# Patient Record
(demographics unavailable — no encounter records)

---

## 2024-11-19 NOTE — PHYSICAL EXAM

## 2024-11-19 NOTE — HISTORY OF PRESENT ILLNESS
[FreeTextEntry1] : 79-year-old male history of diverticulosis 6 cm hiatal hernia and diminutive polyp.  Additionally has bleeding per rectum several times a week related to his known grade 1 internal hemorrhoids.  He underwent endoscopy and colonoscopy by Dr. Jerald Garcia MD.  He has occasional dysphagia to chicken.  There is no weight loss, anorexia or regurgitation.  He denies any shortness of breath or chest pain.  He was seen in the accompaniment of his wife and a Irish-speaking  in the office  RTO 11/19/24- feels well, Son Martin Mosqueda MD GI is on phone for visit. REviewed recent VCE which was NORMAL. We discussed that possible 6 cm hiatal hernia with ? familia lesion can be source of anemia. Has followup with hematology next month after having completed 3 Injectafer infusions . Feels well.Maintains pantoprazole. [de-identified] : 10/2024 normal

## 2024-11-19 NOTE — ASSESSMENT
[FreeTextEntry1] : 79-year-old male history of diverticulosis 6 cm hiatal hernia and diminutive polyp.  Additionally has bleeding per rectum several times a week related to his known grade 1 internal hemorrhoids.  He underwent endoscopy and colonoscopy by Dr. Jerald Garcia MD.  He has occasional dysphagia to chicken.  There is no weight loss, anorexia or regurgitation.  He denies any shortness of breath or chest pain.  He was seen in the accompaniment of his wife and a Guamanian-speaking  in the office  RTO 11/19/24- feels well, Son Martin Mosqueda MD GI is on phone for visit. REviewed recent VCE which was NORMAL. We discussed that possible 6 cm hiatal hernia with ? familia lesion can be source of anemia. Has followup with hematology next month after having completed 3 Injectafer infusions . Feels well.Maintains pantoprazole.  IMP 1. anemia, NORMAL VE 2. Large hiatal hernia, 6 cm 3. INternal hem 4. Hx of TA 5. diverticulosis  PLAN: 1. followup with Dr. Garcia 2. Hematology followup as pt already has appt

## 2024-11-19 NOTE — REVIEW OF SYSTEMS
[As Noted in HPI] : as noted in HPI [Swollen Glands] : swollen glands [Negative] : Heme/Lymph [FreeTextEntry7] : diverticulosis dysphagia

## 2024-12-06 NOTE — ASSESSMENT
[FreeTextEntry1] : Dyspepsia: The patient complains of dyspeptic symptoms.  The patient was advised to continue to abide by an anti-gas (low FOD-MAP) diet.  The patient was previously given a pamphlet for anti-gas (low FOD-MAP).  The patient and I reviewed the anti-gas (low FOD-MAP)diet at length again. The patient is to continue on a trial of Simethicone one tablet 4 times a day p.r.n. abdominal pain and gas. Rectal Bleeding: The patient had episodes of rectal bleeding.  The etiology of the rectal bleeding is unclear but most likely secondary to hemorrhoids.  I recommend a trial of Anusol HC suppositories one per rectum QHS and Anusol HC 2.5% cream apply to affected area twice a day PRN hemorrhoidal bleeding or pain.  I recommend a trial of Calmoseptine cream apply to affected area twice a day for rectal discomfort. I recommend Tucks pads for the hemorrhoids.  I recommend starting Sitz baths twice a day for the hemorrhoids.  I recommend avoid wearing tight underwear and use boxers. I recommend avoid touching the perineal area.  Blood Work: I recommend obtaining the recent blood work performed by the patient's PMD. Prior Small Bowel Capsule Endoscopy: The capsule endoscopy performed August 25, 2021 revealed capsule remaining in the stomach without passing in the duodenum rule out delayed gastric emptying. Anemia: The patient was found to have anemia on prior blood work. The upper endoscopy and colonoscopy were inconclusive of the etiology of the anemia. The repeat small bowel capsule endoscopy performed by Dr. Silvano Snowden on October 8, 2024 normal. The patient denies any bright red blood per rectum, melena or hematemesis. I recommend following to hemoglobin/hematocrit level. The patient was also advised to follow up with his hematologist, Dr. Ulises Mendoza regarding the anemia for possible iron supplementation.  Reflux Esophagitis: The patient had reflux esophagitis noted on prior upper endoscopy. The patient was advised to avoid late-night meals and dietary indiscretions. The patient was advised to avoid fried and fatty foods. The patient was advised to abide by an anti-GERD diet. The patient was given a pamphlet for anti-GERD. The patient and I reviewed the anti-GERD diet at length. I recommend a trial of pantoprazole 40 mg once a day x 3 months for the reflux esophagitis. Gastritis: The patient has a history of gastritis noted on prior upper endoscopy. The patient is to avoid nonsteroidal anti-inflammatory drugs and aspirin. I recommend a trial of pantoprazole 40 mg once a day for 3 months for the symptoms. Intestinal Metaplasia of the Stomach: The patient was found to have intestinal metaplasia of the stomach on prior upper endoscopy. The findings of intestinal metaplasia of the stomach have an increased risk of gastric cancer. The biopsies did not reveal dysplasia. I recommend a repeat upper endoscopy with mapping in 3 years to reassess for intestinal metaplasia and dysplasia unless symptomatic. The patient is aware of the increased risk of intestinal metaplasia and progression to stomach cancer. The patient agrees to follow-up. History of Colonic Polyp: The patient was found to have colonic polyps on prior colonoscopy. I recommend a repeat colonoscopy in 5 years to reassess for colonic polyps pending patient's health unless symptomatic. The patient agreed and will follow up for the procedure. Diverticulosis: I recommend a high-fiber diet and avoid seeds. The patient is to consider a trial of Metamucil once a day for fiber supplementation. The patient is to also consider a trial of a probiotic such as Align once a day. The patient and I discussed the potential risk of diverticulitis and diverticular bleeding secondary to diverticular disease. The patient is aware of the importance of follow-up if these complications arise. Internal Hemorrhoids: The patient is to consider starting a trial of Anusol H. C. suppositories one per rectum nightly and Anusol HC2.5% cream apply to affected area twice a day p.r.n. hemorrhoidal bleeding or pain. I also recommend a trial of Calmoseptine cream apply to affected area twice a day for hemorrhoidal discomfort. I recommend Tucks pads for the hemorrhoids. I recommend Sitz baths twice a day for the hemorrhoids. I recommend avoid wearing tight underwear and use boxers. I recommend avoid touching the perineal area. The patient agreed to followup. Prior Endoscopic Procedures: The patient had a prior upper endoscopy and colonoscopy performed by another gastroenterologist, Dr. Mario Rodriguez. I reviewed the prior upper endoscopy and colonoscopy reports. The patient signed a record release to obtain those records. I recommend a surgical evaluation for possible repair of the large hiatal hernia for possible Marvel's ulcer in the hiatal hernia sac. I discussed the case with the patient's son, Alyssa Martin Mosqueda over the phone. Follow-up: The patient is to follow-up in the office in 6 months to reassess the symptoms. The patient was told to call the office if any further problems.

## 2024-12-06 NOTE — HISTORY OF PRESENT ILLNESS
[de-identified] : The upper endoscopy performed at the [facility] [place] GI endoscopy suite on January 16, 2024 revealed a large hiatal hernia and mild diffuse gastritis. The gastric pathology performed on January 16, 2024 revealed esophageal mucosa with squamous esophageal mucosa with reflux changes with no columnar mucosa present in the submitted tissue with a PASF stain that is negative for fungal microorganisms (esophagus), gastric antral mucosa with mild chronic antral gastritis with focal intestinal goblet cell metaplasia that was negative for dysplasia and that was negative for Helicobacter pylori (greater curvature of the antrum), gastric antral mucosa with mild chronic antral gastritis with reactive changes that was negative for Helicobacter pylori (lesser curvature of the antrum), gastric body mucosa with gastric oxyntic mucosa with mild chronic gastritis with reactive changes that was negative for Helicobacter pylori (greater curvature of the body of the stomach), gastric body mucosa with gastric oxyntic mucosa with mild chronic gastritis with reactive changes that was negative for Helicobacter pylori (body of the stomach in the lesser curvature), gastric mucosa with mild chronic gastritis with reactive changes that was negative for Helicobacter pylori (angularis), and duodenal mucosa with preserved villous architecture and no significant histopathologic changes that was negative for increased intraepithelial lymphocytes (duodenum).   The upper endoscopy performed by another gastroenterologist, Dr. Mario Rodriguez, on June 9, 2021 revealed a large sliding esophageal hiatal hernia, normal mucosa in the upper third of the esophagus and lower third of the esophagus, erythema in the antrum, incisura of the stomach, stomach body, cardia and fundus compatible with gastritis and a normal duodenum and second portion of the duodenum without evidence of ulceration or mass lesions.  Biopsies were taken of the esophagus, stomach and duodenum.  The gastric pathology performed on June 9, 2021 revealed squamous epithelium with basal cell hyperplasia that was negative for fungal microorganisms (upper third of the esophagus and gastroesophageal junction), chronic active gastritis that was negative for intestinal metaplasia and negative for Helicobacter pylori (cardia-fundus, proximal stomach, distal stomach), reactive gastropathy and chronic inactive gastritis that was mild that was negative for Helicobacter pylori but positive for intestinal metaplasia but negative for dysplasia (antrum-incisura of the stomach) and duodenal mucosa with preserved villous architecture with no evidence of celiac disease (CD3 stain) (duodenal bulb and second portion of the duodenum).  [FreeTextEntry1] : The colonoscopy to the terminal ileum performed at the [facility] [place] GI endoscopy suite on January 16, 2024 revealed an ascending colon polyp, moderate left sided diverticulosis and small internal hemorrhoids. The colonic polyp was snared and removed with a cold snare. There was minimal bleeding post polypectomy. An endoclip was placed to prevent post-polypectomy bleeding. There were no other polyps, masses, AVMs or colitis noted. The colonic pathology performed on January 16, 2024 revealed fragments of a tubular adenoma and fragments of unremarkable colonic mucosa (ascending colon polyp).  The colonoscopy performed by another gastroenterologist, Dr. Mario Rodriguez, on April 28, 2021 revealed a cecal polyp, moderate diverticulosis of the descending colon and sigmoid colon and grade/stage II internal hemorrhoids.  The colonic polyp was snared and removed.  The pathology revealed a tubular adenoma.   [de-identified] :  The capsule endoscopy performed August 25, 2021 revealed capsule remaining in the stomach without passing in the duodenum rule out delayed gastric emptying.

## 2024-12-06 NOTE — HISTORY OF PRESENT ILLNESS
[de-identified] : The upper endoscopy performed at the [facility] [place] GI endoscopy suite on January 16, 2024 revealed a large hiatal hernia and mild diffuse gastritis. The gastric pathology performed on January 16, 2024 revealed esophageal mucosa with squamous esophageal mucosa with reflux changes with no columnar mucosa present in the submitted tissue with a PASF stain that is negative for fungal microorganisms (esophagus), gastric antral mucosa with mild chronic antral gastritis with focal intestinal goblet cell metaplasia that was negative for dysplasia and that was negative for Helicobacter pylori (greater curvature of the antrum), gastric antral mucosa with mild chronic antral gastritis with reactive changes that was negative for Helicobacter pylori (lesser curvature of the antrum), gastric body mucosa with gastric oxyntic mucosa with mild chronic gastritis with reactive changes that was negative for Helicobacter pylori (greater curvature of the body of the stomach), gastric body mucosa with gastric oxyntic mucosa with mild chronic gastritis with reactive changes that was negative for Helicobacter pylori (body of the stomach in the lesser curvature), gastric mucosa with mild chronic gastritis with reactive changes that was negative for Helicobacter pylori (angularis), and duodenal mucosa with preserved villous architecture and no significant histopathologic changes that was negative for increased intraepithelial lymphocytes (duodenum).   The upper endoscopy performed by another gastroenterologist, Dr. Mario Rodriguez, on June 9, 2021 revealed a large sliding esophageal hiatal hernia, normal mucosa in the upper third of the esophagus and lower third of the esophagus, erythema in the antrum, incisura of the stomach, stomach body, cardia and fundus compatible with gastritis and a normal duodenum and second portion of the duodenum without evidence of ulceration or mass lesions.  Biopsies were taken of the esophagus, stomach and duodenum.  The gastric pathology performed on June 9, 2021 revealed squamous epithelium with basal cell hyperplasia that was negative for fungal microorganisms (upper third of the esophagus and gastroesophageal junction), chronic active gastritis that was negative for intestinal metaplasia and negative for Helicobacter pylori (cardia-fundus, proximal stomach, distal stomach), reactive gastropathy and chronic inactive gastritis that was mild that was negative for Helicobacter pylori but positive for intestinal metaplasia but negative for dysplasia (antrum-incisura of the stomach) and duodenal mucosa with preserved villous architecture with no evidence of celiac disease (CD3 stain) (duodenal bulb and second portion of the duodenum).  [FreeTextEntry1] : The colonoscopy to the terminal ileum performed at the [facility] [place] GI endoscopy suite on January 16, 2024 revealed an ascending colon polyp, moderate left sided diverticulosis and small internal hemorrhoids. The colonic polyp was snared and removed with a cold snare. There was minimal bleeding post polypectomy. An endoclip was placed to prevent post-polypectomy bleeding. There were no other polyps, masses, AVMs or colitis noted. The colonic pathology performed on January 16, 2024 revealed fragments of a tubular adenoma and fragments of unremarkable colonic mucosa (ascending colon polyp).  The colonoscopy performed by another gastroenterologist, Dr. Mario Rodriguez, on April 28, 2021 revealed a cecal polyp, moderate diverticulosis of the descending colon and sigmoid colon and grade/stage II internal hemorrhoids.  The colonic polyp was snared and removed.  The pathology revealed a tubular adenoma.   [de-identified] :  The capsule endoscopy performed August 25, 2021 revealed capsule remaining in the stomach without passing in the duodenum rule out delayed gastric emptying.

## 2024-12-17 NOTE — HISTORY OF PRESENT ILLNESS
[de-identified] : HILDA LUNDY is a 79 year old male who present in the office for initial consultation who was referred by Dr. Garcia with the chief complaint of having Large hiatal hernia. Patient has had the condition for over a year. Patient reports occasional dysphagia to chicken, c/o Reflux. He had an Endoscopy with Dr Garcia showed large hiatal hernia ( 6 cm), Small Bowel Capsule Endoscopy: 10/2024 normal. We recommend Robotic assisted Laparoscopic hiatal hernia repair, possible open, post Esophageal manometry. Patient was advised to make an appointment with MD Devonte Weber.    was used for this visit ID  [id number]

## 2024-12-17 NOTE — HISTORY OF PRESENT ILLNESS
[de-identified] : HILDA LUNDY is a 79 year old male who present in the office for initial consultation who was referred by Dr. Garcia with the chief complaint of having Large hiatal hernia. Patient has had the condition for over a year. Patient reports occasional dysphagia to chicken, c/o Reflux. He had an Endoscopy with Dr Garcia showed large hiatal hernia ( 6 cm), Small Bowel Capsule Endoscopy: 10/2024 normal. We recommend Robotic assisted Laparoscopic hiatal hernia repair, possible open, post Esophageal manometry. Patient was advised to make an appointment with MD Devonte Weber.    was used for this visit ID  [id number]

## 2024-12-17 NOTE — ADDENDUM
[FreeTextEntry1] :  I spent 50 minutes reviewing the patient's chart, labs, imaging, interviewing and examining patient, and discussing plan of care with the patient, and other providers, excluding teaching and separately reported services and separately billable procedures.

## 2024-12-17 NOTE — ASSESSMENT
[FreeTextEntry1] : HILDA LUNDY 79 year for Robotic assisted Laparoscopic Hiatal hernia repair. Risks, benefits and alternatives discussed including bleeding, infection, recurrence, nerve injury, chronic pain, swelling and hematoma. All questions answered. Agrees to proceed.   We recommend Robotic assisted Laparoscopic hiatal hernia repair, possible open, post Esophageal manometry. Patient was advised to make an appointment with MD Devonte Weber.

## 2024-12-17 NOTE — PHYSICAL EXAM
[Normal Breath Sounds] : Normal breath sounds [Normal Heart Sounds] : normal heart sounds [Normal Rate and Rhythm] : normal rate and rhythm [No Rash or Lesion] : No rash or lesion [Alert] : alert [Oriented to Person] : oriented to person [Oriented to Place] : oriented to place [Oriented to Time] : oriented to time [Calm] : calm [de-identified] : The patient is alert, well-groomed  [de-identified] : Normoactive bowel sounds, soft and nontender, no hepatosplenomegaly or masses noted.  [de-identified] : full range of motion and no deformities appreciated.

## 2025-03-25 NOTE — PLAN
[FreeTextEntry1] : Mr. HILDA LUNDY Patient was told significance of findings, options, risks and benefits were explained. Informed consent for Robotic assisted Laparoscopic hiatal hernia repair, possible open and potential risks, benefits and alternatives (surgical options were discussed including non-surgical options or the option of no surgery) to the planned surgery were discussed in depth. All surgical options were discussed including non-surgical treatments. The patient wishes to proceed with surgery. We will plan for surgery on at the next available date, pending any required insurance pre-certification or pre-approval. Patient agrees to obtain any necessary pre-operative evaluations and testing prior to surgery. Patient advised to seek immediate medical attention with any acute change in symptoms or with the development of any new or worsening symptoms. Patient's questions and concerns addressed to patient's satisfaction, and patient verbalized an understanding of the information discussed.  Will schedule for the surgery at [facility].  [facility]

## 2025-03-25 NOTE — HISTORY OF PRESENT ILLNESS
[de-identified] : HILDA LUNDY is a 79 year old male who presents in the office for follow up visit for hiatal hernia repair.  Patient had admitted to [facility], post couple of falls. Patient found to be anemia had blood transfusion. He had an Esophageal manometry pending results. He will be scheduled for Robotic assisted Laparoscopic hiatal hernia repair, possible open.  Today patient is doing well, offers no complaints, we spoke to the daughter and family regarding the surgery, questions and concerns addressed to patient's satisfaction.

## 2025-03-25 NOTE — ASSESSMENT
[FreeTextEntry1] : HILDA LUNDY 79 year for Robotic assisted Laparoscopic hiatal hernia repair, possible open. Risks, benefits and alternatives discussed including bleeding, infection, recurrence, chronic pain. All questions answered. Agrees to proceed.

## 2025-03-25 NOTE — PLAN
[FreeTextEntry1] : Mr. HILDA LUNDY Patient was told significance of findings, options, risks and benefits were explained. Informed consent for Robotic assisted Laparoscopic hiatal hernia repair, possible open and potential risks, benefits and alternatives (surgical options were discussed including non-surgical options or the option of no surgery) to the planned surgery were discussed in depth. All surgical options were discussed including non-surgical treatments. The patient wishes to proceed with surgery. We will plan for surgery on at the next available date, pending any required insurance pre-certification or pre-approval. Patient agrees to obtain any necessary pre-operative evaluations and testing prior to surgery. Patient advised to seek immediate medical attention with any acute change in symptoms or with the development of any new or worsening symptoms. Patient's questions and concerns addressed to patient's satisfaction, and patient verbalized an understanding of the information discussed.  Will schedule for the surgery at [facility].  [facility]

## 2025-03-25 NOTE — PHYSICAL EXAM
[Normal Breath Sounds] : Normal breath sounds [Normal Heart Sounds] : normal heart sounds [Normal Rate and Rhythm] : normal rate and rhythm [No Rash or Lesion] : No rash or lesion [Alert] : alert [Oriented to Person] : oriented to person [Oriented to Place] : oriented to place [Oriented to Time] : oriented to time [Calm] : calm [de-identified] : The patient is alert, well-groomed  [de-identified] : Normoactive bowel sounds, soft and nontender, no hepatosplenomegaly or masses noted.  [de-identified] : full range of motion and no deformities appreciated.

## 2025-03-25 NOTE — ADDENDUM
[FreeTextEntry1] :  I spent 30 minutes reviewing the patient's chart, labs, imaging, interviewing and examining patient, and discussing plan of care with the patient, and other providers, excluding teaching and separately reported services and separately billable procedures.

## 2025-03-25 NOTE — PHYSICAL EXAM
[Normal Breath Sounds] : Normal breath sounds [Normal Heart Sounds] : normal heart sounds [Normal Rate and Rhythm] : normal rate and rhythm [No Rash or Lesion] : No rash or lesion [Alert] : alert [Oriented to Person] : oriented to person [Oriented to Place] : oriented to place [Oriented to Time] : oriented to time [Calm] : calm [de-identified] : The patient is alert, well-groomed  [de-identified] : Normoactive bowel sounds, soft and nontender, no hepatosplenomegaly or masses noted.  [de-identified] : full range of motion and no deformities appreciated.

## 2025-03-25 NOTE — PLAN
[FreeTextEntry1] : Mr. HILDA LUNDY Patient was told significance of findings, options, risks and benefits were explained. Informed consent for Robotic assisted Laparoscopic hiatal hernia repair, possible open and potential risks, benefits and alternatives (surgical options were discussed including non-surgical options or the option of no surgery) to the planned surgery were discussed in depth. All surgical options were discussed including non-surgical treatments. The patient wishes to proceed with surgery. We will plan for surgery on at the next available date, pending any required insurance pre-certification or pre-approval. Patient agrees to obtain any necessary pre-operative evaluations and testing prior to surgery. Patient advised to seek immediate medical attention with any acute change in symptoms or with the development of any new or worsening symptoms. Patient's questions and concerns addressed to patient's satisfaction, and patient verbalized an understanding of the information discussed.  Will schedule for the surgery at [facility].  [facility]

## 2025-03-25 NOTE — HISTORY OF PRESENT ILLNESS
[de-identified] : HILDA LUNDY is a 79 year old male who presents in the office for follow up visit for hiatal hernia repair.  Patient had admitted to [facility], post couple of falls. Patient found to be anemia had blood transfusion. He had an Esophageal manometry pending results. He will be scheduled for Robotic assisted Laparoscopic hiatal hernia repair, possible open.  Today patient is doing well, offers no complaints, we spoke to the daughter and family regarding the surgery, questions and concerns addressed to patient's satisfaction.

## 2025-03-25 NOTE — PHYSICAL EXAM
[Normal Breath Sounds] : Normal breath sounds [Normal Heart Sounds] : normal heart sounds [Normal Rate and Rhythm] : normal rate and rhythm [No Rash or Lesion] : No rash or lesion [Alert] : alert [Oriented to Person] : oriented to person [Oriented to Place] : oriented to place [Oriented to Time] : oriented to time [Calm] : calm [de-identified] : The patient is alert, well-groomed  [de-identified] : Normoactive bowel sounds, soft and nontender, no hepatosplenomegaly or masses noted.  [de-identified] : full range of motion and no deformities appreciated.

## 2025-03-25 NOTE — HISTORY OF PRESENT ILLNESS
[de-identified] : HILDA LUNDY is a 79 year old male who presents in the office for follow up visit for hiatal hernia repair.  Patient had admitted to [facility], post couple of falls. Patient found to be anemia had blood transfusion. He had an Esophageal manometry pending results. He will be scheduled for Robotic assisted Laparoscopic hiatal hernia repair, possible open.  Today patient is doing well, offers no complaints, we spoke to the daughter and family regarding the surgery, questions and concerns addressed to patient's satisfaction.

## 2025-05-27 NOTE — HISTORY OF PRESENT ILLNESS
[de-identified] : HILDA LUNDY is a 79 year old male with PMHx of hypertension, hyperlipidemia, hemorrhoids, varicose veins of BLE, anemia and GERD who presents in the office for follow up visit for hiatal hernia repair.  Patient underwent Robotic-assisted laparoscopic hiatal hernia repair with Nissen fundoplication on 5/12/2025. Today patient is doing well, offers no complaints. Denies any fevers, chills, nausea, vomiting, diarrhea or constipation. Patient able to tolerate Stage 3 diet with normal bowel movements. Surgical incisions are healing well. No sign of inflammation or exudate.

## 2025-05-27 NOTE — ASSESSMENT
[FreeTextEntry1] : HILDA LUNDY is a 79 year old male who underwent a Robotic-assisted laparoscopic hiatal hernia repair with Nissen fundoplication on 5/12/2025.    Patient is doing well. All surgical incisions are healing well and as expected. There is no evidence of infection or complication, and patient is progressing as expected. Post-operative wound care, activity, restrictions and precautions reinforced. Patient's questions and concerns addressed to patient's satisfaction.    Nutritional counseling has been provided. The patient is encouraged to increased diet as tolerated, stay on stage 3 diet for now.  Also, emphasis has been placed on the importance of adequate hydration, multi-vitamin supplementation and exercise  RTO next months

## 2025-05-27 NOTE — PHYSICAL EXAM
[Normal Breath Sounds] : Normal breath sounds [Normal Heart Sounds] : normal heart sounds [Normal Rate and Rhythm] : normal rate and rhythm [No Rash or Lesion] : No rash or lesion [Alert] : alert [Oriented to Person] : oriented to person [Oriented to Place] : oriented to place [Oriented to Time] : oriented to time [Calm] : calm [de-identified] : The patient is alert, well-groomed  [de-identified] : Normoactive bowel sounds, soft and nontender, no hepatosplenomegaly or masses noted.  [de-identified] : full range of motion and no deformities appreciated.  [de-identified] : Incision sites are healing well.

## 2025-05-27 NOTE — PHYSICAL EXAM
[Normal Breath Sounds] : Normal breath sounds [Normal Heart Sounds] : normal heart sounds [Normal Rate and Rhythm] : normal rate and rhythm [No Rash or Lesion] : No rash or lesion [Alert] : alert [Oriented to Person] : oriented to person [Oriented to Place] : oriented to place [Oriented to Time] : oriented to time [Calm] : calm [de-identified] : The patient is alert, well-groomed  [de-identified] : Normoactive bowel sounds, soft and nontender, no hepatosplenomegaly or masses noted.  [de-identified] : full range of motion and no deformities appreciated.  [de-identified] : Incision sites are healing well.

## 2025-05-27 NOTE — HISTORY OF PRESENT ILLNESS
[de-identified] : HILDA LUNDY is a 79 year old male with PMHx of hypertension, hyperlipidemia, hemorrhoids, varicose veins of BLE, anemia and GERD who presents in the office for follow up visit for hiatal hernia repair.  Patient underwent Robotic-assisted laparoscopic hiatal hernia repair with Nissen fundoplication on 5/12/2025. Today patient is doing well, offers no complaints. Denies any fevers, chills, nausea, vomiting, diarrhea or constipation. Patient able to tolerate Stage 3 diet with normal bowel movements. Surgical incisions are healing well. No sign of inflammation or exudate.

## 2025-05-27 NOTE — HISTORY OF PRESENT ILLNESS
[de-identified] : HILDA LUNDY is a 79 year old male with PMHx of hypertension, hyperlipidemia, hemorrhoids, varicose veins of BLE, anemia and GERD who presents in the office for follow up visit for hiatal hernia repair.  Patient underwent Robotic-assisted laparoscopic hiatal hernia repair with Nissen fundoplication on 5/12/2025. Today patient is doing well, offers no complaints. Denies any fevers, chills, nausea, vomiting, diarrhea or constipation. Patient able to tolerate Stage 3 diet with normal bowel movements. Surgical incisions are healing well. No sign of inflammation or exudate.

## 2025-05-27 NOTE — PHYSICAL EXAM
[Normal Breath Sounds] : Normal breath sounds [Normal Heart Sounds] : normal heart sounds [Normal Rate and Rhythm] : normal rate and rhythm [No Rash or Lesion] : No rash or lesion [Alert] : alert [Oriented to Person] : oriented to person [Oriented to Place] : oriented to place [Oriented to Time] : oriented to time [Calm] : calm [de-identified] : The patient is alert, well-groomed  [de-identified] : Normoactive bowel sounds, soft and nontender, no hepatosplenomegaly or masses noted.  [de-identified] : full range of motion and no deformities appreciated.  [de-identified] : Incision sites are healing well.

## 2025-07-03 NOTE — PHYSICAL EXAM
[Normal Breath Sounds] : Normal breath sounds [Normal Heart Sounds] : normal heart sounds [Normal Rate and Rhythm] : normal rate and rhythm [No Rash or Lesion] : No rash or lesion [Alert] : alert [Oriented to Person] : oriented to person [Oriented to Place] : oriented to place [Oriented to Time] : oriented to time [Calm] : calm [de-identified] : The patient is alert, well-groomed  [de-identified] : Normoactive bowel sounds, soft and nontender, no hepatosplenomegaly or masses noted.  [de-identified] : full range of motion and no deformities appreciated.  [de-identified] : Incision sites are healed well.

## 2025-07-03 NOTE — HISTORY OF PRESENT ILLNESS
[de-identified] : HILDA LUNDY is a 79 year old male with PMHx of hypertension, hyperlipidemia, hemorrhoids, varicose veins of BLE, anemia and GERD who presents in the office for follow up visit for hiatal hernia repair.  Patient underwent Robotic-assisted laparoscopic hiatal hernia repair with Nissen fundoplication on 5/12/2025. Today patient is doing well, offers no complaints. Denies any fevers, chills, nausea, vomiting, diarrhea or constipation. Patient able to tolerate regular diet with normal bowel movements. Surgical incisions are healed well. No sign of inflammation or exudate.

## 2025-07-03 NOTE — HISTORY OF PRESENT ILLNESS
[de-identified] : HILDA LUNDY is a 79 year old male with PMHx of hypertension, hyperlipidemia, hemorrhoids, varicose veins of BLE, anemia and GERD who presents in the office for follow up visit for hiatal hernia repair.  Patient underwent Robotic-assisted laparoscopic hiatal hernia repair with Nissen fundoplication on 5/12/2025. Today patient is doing well, offers no complaints. Denies any fevers, chills, nausea, vomiting, diarrhea or constipation. Patient able to tolerate regular diet with normal bowel movements. Surgical incisions are healed well. No sign of inflammation or exudate.

## 2025-07-03 NOTE — PHYSICAL EXAM
[Normal Breath Sounds] : Normal breath sounds [Normal Heart Sounds] : normal heart sounds [Normal Rate and Rhythm] : normal rate and rhythm [No Rash or Lesion] : No rash or lesion [Alert] : alert [Oriented to Person] : oriented to person [Oriented to Place] : oriented to place [Oriented to Time] : oriented to time [Calm] : calm [de-identified] : The patient is alert, well-groomed  [de-identified] : Normoactive bowel sounds, soft and nontender, no hepatosplenomegaly or masses noted.  [de-identified] : full range of motion and no deformities appreciated.  [de-identified] : Incision sites are healed well.

## 2025-07-03 NOTE — ASSESSMENT
[FreeTextEntry1] : HILDA LUNDY is a 79 year old male who underwent a Robotic-assisted laparoscopic hiatal hernia repair with Nissen fundoplication on 5/12/2025.    Patient is doing well.    Nutritional counseling has been provided. The patient is encouraged to increased diet as tolerated, stay on regular diet for now.  Also, emphasis has been placed on the importance of adequate hydration, multi-vitamin supplementation and exercise